# Patient Record
Sex: FEMALE | Race: WHITE | URBAN - METROPOLITAN AREA
[De-identification: names, ages, dates, MRNs, and addresses within clinical notes are randomized per-mention and may not be internally consistent; named-entity substitution may affect disease eponyms.]

---

## 2017-03-20 DIAGNOSIS — N92.0 EXCESSIVE OR FREQUENT MENSTRUATION: ICD-10-CM

## 2017-03-20 NOTE — TELEPHONE ENCOUNTER
drospirenone-ethinyl estradiol (OCELLA) 3-0.03 MG per tablet      Last Written Prescription Date: 12/28/16  Last Fill Quantity: 84,  # refills: 0   Last Office Visit with [facility], P or [facility] prescribing provider: 01/26/16      [facility]

## 2017-03-22 NOTE — TELEPHONE ENCOUNTER
Mirian Gonzalez contacted [facility] on 03/22/17 and left a message. If patient calls back please contact care team.  Patient needs to schedule an appointment in order to get this refilled.  Mirian Gonzalez [facility]  March 22, 2017 11:53 AM

## 2017-04-25 RX ORDER — DROSPIRENONE AND ETHINYL ESTRADIOL 0.03MG-3MG
1 KIT ORAL DAILY
Qty: 84 TABLET | Refills: 0 | OUTPATIENT
Start: 2017-04-25

## 2017-04-25 NOTE — TELEPHONE ENCOUNTER
Phone call to patient and she stated she knows she needs to be seen and will schedule appt now. Scheduled per her request as below:  Next 5 appointments (look out 90 days)     May 12, 2017 10:15 AM CDT   PHYSICAL with Mahad Fox MD   [facility] ([facility])    [address] [place] [id number] [phone]                Patient stated she will have enough med to get her to her appt. Patient appreciative of assistance.   Medication is being denied due to: as noted above. Elen Lima RN, BAN

## 2017-05-17 ENCOUNTER — OFFICE VISIT (OUTPATIENT)
Dept: OBGYN | Facility: CLINIC | Age: 57
End: 2017-05-17
Payer: COMMERCIAL

## 2017-05-17 VITALS
HEIGHT: 64 IN | HEART RATE: 65 BPM | TEMPERATURE: 98.4 F | BODY MASS INDEX: 16.9 KG/M2 | WEIGHT: 99 LBS | SYSTOLIC BLOOD PRESSURE: 111 MMHG | DIASTOLIC BLOOD PRESSURE: 66 MMHG

## 2017-05-17 DIAGNOSIS — Z01.411 ENCOUNTER FOR GYNECOLOGICAL EXAMINATION WITH ABNORMAL FINDING: Primary | ICD-10-CM

## 2017-05-17 DIAGNOSIS — Z12.31 ENCOUNTER FOR SCREENING MAMMOGRAM FOR BREAST CANCER: ICD-10-CM

## 2017-05-17 DIAGNOSIS — F41.8 ANTICIPATORY ANXIETY: ICD-10-CM

## 2017-05-17 DIAGNOSIS — Z12.11 ENCOUNTER FOR COLORECTAL CANCER SCREENING: ICD-10-CM

## 2017-05-17 DIAGNOSIS — L98.9 SKIN LESION: ICD-10-CM

## 2017-05-17 DIAGNOSIS — Z12.12 ENCOUNTER FOR COLORECTAL CANCER SCREENING: ICD-10-CM

## 2017-05-17 DIAGNOSIS — B35.1 DERMATOPHYTOSIS OF NAIL: ICD-10-CM

## 2017-05-17 PROCEDURE — 99396 PREV VISIT EST AGE 40-64: CPT | Performed by: OBSTETRICS & GYNECOLOGY

## 2017-05-17 RX ORDER — PROPRANOLOL HYDROCHLORIDE 10 MG/1
TABLET ORAL
Refills: 0 | COMMUNITY
Start: 2016-06-22 | End: 2017-05-17

## 2017-05-17 RX ORDER — PROPRANOLOL HYDROCHLORIDE 10 MG/1
10 TABLET ORAL 2 TIMES DAILY
Qty: 30 TABLET | Refills: 2 | Status: SHIPPED | OUTPATIENT
Start: 2017-05-17

## 2017-05-17 NOTE — MR AVS SNAPSHOT
After Visit Summary   5/17/2017    Sharon Nayak    MRN: [id number]           Patient Information     Date Of Birth          1960        Visit Information        Provider Department      5/17/2017 3:15 PM Mahad Fox MD [facility]        Care Instructions                                                        If you have any questions regarding your visit, Please contact your care team.     [facility]: [phone]    [facility] CLINIC HOURS TELEPHONE NUMBER   PASCUAL Burks-    Kishan Myrick-GEMA Aviles-Medical Assistant   Monday-[place]  8:00a.m-4:45 p.m  Wednesday-[place] 8:00a.m-4:45 p.m.  Thursday-[place]  8:00a.m-4:45 p.m.  Friday-[place]  8:00a.m-4:45 p.m. [facility]  [address] [place] [address]  [phone] ask for [facility]  [phone] Fax  Imaging [phone]    [facility] Labor [facility] Delivery  [address] [place] [address] [address].  [place], [place] [address] [phone]    [facility]  [address] [place] [address]  [phone] ask [facility]  [phone] Fax  Imaging [phone]     Urgent Care locations:    [facility] Monday-Friday  5 pm - 9 pm  Saturday and Sunday   9 am - 5 pm    Monday-Friday   11 am - 9 pm  Saturday and Sunday   9 am - 5 pm   [phone]       If you need a medication refill, please contact your pharmacy. Please allow 3 business days for your refill to be completed.  As always, Thank you for trusting us with your healthcare needs!          Follow-ups after your visit        Who to contact     If you have questions or need follow up information about today's clinic visit or your schedule please contact [facility] directly at [phone].  Normal or non-critical lab and imaging results will be communicated to you by  "MyChart, letter or phone within 4 business days after the clinic has received the results. If you do not hear from us within 7 days, please contact the clinic through [facility] or phone. If you have a critical or abnormal lab result, we will notify you by phone as soon as possible.  Submit refill requests through [facility] or call your pharmacy and they will forward the refill request to us. Please allow 3 business days for your refill to be completed.          Additional Information About Your Visit        [facility] Information     [facility] lets you send messages to your doctor, view your test results, renew your prescriptions, schedule appointments and more. To sign up, go to [email or web address] . Click on \"Log in\" on the left side of the screen, which will take you to the Welcome page. Then click on \"Sign up Now\" on the right side of the page.     You will be asked to enter the access code listed below, as well as some personal information. Please follow the directions to create your username and password.     Your access code is: [email or web address]  Expires: 8/15/2017  3:35 PM     Your access code will  in 90 days. If you need help or a new code, please call your [place] clinic or [phone].        Care EveryWhere ID     This is your Care EveryWhere ID. This could be used by other organizations to access your [place] medical records  [id number]        Your Vitals Were     Pulse Temperature Height Last Period Breastfeeding? BMI (Body Mass Index)    65 98.4  F (36.9  C) (Oral) 5' 3.85\" (1.622 m) 2017 No 17.07 kg/m2       Blood Pressure from Last 3 Encounters:   17 111/66   16 103/42   14 103/62    Weight from Last 3 Encounters:   17 99 lb (44.9 kg)   16 98 lb (44.5 kg)   14 98 lb (44.5 kg)              Today, you had the following     No orders found for display       Primary Care Provider Office Phone # Fax #    Shyla Callahan [phone]       [facility] " [address]  [place] [address]        Thank you!     Thank you for choosing [facility]  for your care. Our goal is always to provide you with excellent care. Hearing back from our patients is one way we can continue to improve our services. Please take a few minutes to complete the written survey that you may receive in the mail after your visit with us. Thank you!             Your Updated Medication List - Protect others around you: Learn how to safely use, store and throw away your medicines at www.disposemymeds.org.          This list is accurate as of: 5/17/17  3:35 PM.  Always use your most recent med list.                   Brand Name Dispense Instructions for use    * ACIDOPHILUS PROBIOTIC BLEND Caps          cholecalciferol 400 UNITS tablet    Vitamin D    30 tablet        drospirenone-ethinyl estradiol 3-0.03 MG per tablet    OCELLA    84 tablet    Take 1 tablet by mouth daily       Fish Oil Oil          * PROBIOTIC PO      Take  by mouth.       propranolol 10 MG tablet    INDERAL     TK 1 T PO BID PRA. TAKE 20-30 MINUTES PRIOR TO ANXIOUS EVENT.       Turmeric Powd          Vitamin C Chew          * Notice:  This list has 2 medication(s) that are the same as other medications prescribed for you. Read the directions carefully, and ask your doctor or other care provider to review them with you.

## 2017-05-17 NOTE — PATIENT INSTRUCTIONS
If you have any questions regarding your visit, Please contact your care team.     [facility]: [phone]    [facility] CLINIC HOURS TELEPHONE NUMBER   PASCUAL Burks-    Kishan Myrick-GEMA Aviles-Medical Assistant   Monday-[place]  8:00a.m-4:45 p.m  Wednesday-[place] 8:00a.m-4:45 p.m.  Thursday-[place]  8:00a.m-4:45 p.m.  Friday-[place]  8:00a.m-4:45 p.m. [facility]  [address]  [place], [place] [address] [phone] ask [facility]  [phone] Fax  Imaging [phone]    [facility]  [address] [place] [address] Dr.  [place], [place] [address] [phone]    [facility]  [address] [address]  [phone] ask [facility]  [phone] Fax  Imaging [phone]     Urgent Care locations:    [place]        [place] Monday-Friday  5 pm - 9 pm  Saturday and Sunday   9 am - 5 pm    Monday-Friday   11 am - 9 pm  Saturday and Sunday   9 am - 5 pm   [phone]       If you need a medication refill, please contact your pharmacy. Please allow 3 business days for your refill to be completed.  As always, Thank you for trusting us with your healthcare needs!

## 2017-05-17 NOTE — NURSING NOTE
"Chief Complaint   Patient presents with     Physical     AFE       Initial /66 (BP Location: Left arm, Patient Position: Chair, Cuff Size: Adult Regular)  Pulse 65  Temp 98.4  F (36.9  C) (Oral)  Ht 5' 3.85\" (1.622 m)  Wt 99 lb (44.9 kg)  LMP 04/20/2017  Breastfeeding? No  BMI 17.07 kg/m2 Estimated body mass index is 17.07 kg/(m^2) as calculated from the following:    Height as of this encounter: 5' 3.85\" (1.622 m).    Weight as of this encounter: 99 lb (44.9 kg).  Medication Reconciliation: complete   Traci Foster CMA      "

## 2017-05-18 NOTE — PROGRESS NOTES
Sharon Nayak is a 57 year old year old who presents for annual exam. Patient's last menstrual period was 04/20/2017.    HPI: Doing well.  Menses q 28-30 days x 3 days.  Significant interval changes: none.  Current significant symptoms: right thumb fungal infection.  Other problems or concerns: renewal of OC?- probable menopause now and suggest stopping, using condoms and observe. Osteoporosis risk and screening- did not have Dexa scan yet and will do this. Inderal refill- this works well before a performance.   Contraceptive method is OC.   Family doing well.   Patient remains active with sports and showing and riding her horses.    Past medical, obstetrical, surgical, family and social history reviewed and as noted or updated in chart.     Allergies, meds and supplements are as noted or updated in chart.     ROS:     Systems reviewed include constitutional; breast;                 cardiac; respiratory; gastrointestinal; genitourinary;                                musculoskeletal; integumentary; psychological;                                hematologic/lymphatic and endocrine.                  These systems were negative for significant symptoms except                 for the following additional: none ; see HPI.                                Exam:  VS as noted.                  Skin, neck, nodes, breasts, abd and pelvis were                             normal or negative except for, or in particular noting, the following                pertinent findings: right thumb dermatophytosis.      Assessment: Gyn exam. Problem List updated.    Plan and Recommendations: Symptoms, problems and concerns reviewed. Health habits and vaccinations reviewed. Calcium/Vitamin D and multi-vitamin supplements instructions given. Breast/skin self-exam awareness. Screening tests including limitations discussed. Follow-up visits discussed. See orders. Has biometric screening through work and labs neg. Mammogram, Dexa scan, and  colonoscopy advised. Pap/HRHPV due in 1 yr. RTC 1 year. Derm referral too.   Medications and prescriptions given as noted.  I reviewed side effects, risks, benefits and instructions on proper use.    Sharon was seen today for physical.    Diagnoses and all orders for this visit:    Encounter for gynecological examination with abnormal finding    Encounter for screening mammogram for breast cancer  -     MA Screening Digital Bilateral; Future    Anticipatory anxiety  -     propranolol (INDERAL) 10 MG tablet; Take 1 tablet (10 mg) by mouth 2 times daily Prior to performance event    Skin lesion  -     DERMATOLOGY REFERRAL    Dermatophytosis of nail  -     DERMATOLOGY REFERRAL    Encounter for colorectal cancer screening  -     GASTROENTEROLOGY ADULT REF PROCEDURE ONLY        Mahad Fox MD

## 2017-07-08 ENCOUNTER — HEALTH MAINTENANCE LETTER (OUTPATIENT)
Age: 57
End: 2017-07-08

## 2018-06-26 ENCOUNTER — TELEPHONE (OUTPATIENT)
Dept: FAMILY MEDICINE | Facility: CLINIC | Age: 58
End: 2018-06-26

## 2018-06-26 NOTE — TELEPHONE ENCOUNTER
6/26/2018    Call Regarding Preventive Health Screening Colonoscopy, Mammogram and Cervical/PAP       Attempt 1    Message on voicemail     Comments:       Outreach   SV

## 2018-07-02 NOTE — TELEPHONE ENCOUNTER
7/2/2018    Call Regarding Preventive Health Screening Colonoscopy, Mammogram and Cervical/PAP       Attempt 2    Message on voicemail     Comments:       Outreach   SV

## 2018-07-09 NOTE — TELEPHONE ENCOUNTER
7/9/2018    Call Regarding Preventive Health Screening Colonoscopy, Mammogram and Cervical/PAP    Attempt 3    Message on voicemail    Comments:       Outreach   Rosa Helms